# Patient Record
(demographics unavailable — no encounter records)

---

## 2025-04-16 NOTE — ASSESSMENT
[FreeTextEntry1] : 1) Onychomycosis with elongated toenails with pain/discomfort: aseptic debridement of elongated mycotic toenails x 8 2) Diabetes: discussed importance of glycemic control, check feet daily for any new lesions, use of proper fit shoes with accommodative insoles 3) Xerosis: recommend use of topical moisturizer BID 4) Dermatitis to feet: Rx clotrimazole-betamethasone BID, Rx mupirocin ointment for prophylaxis  Patient tolerated all treatments well and without any complications Follow up in 3 months [Verbal] : verbal [Patient] : patient [Good - alert, interested, motivated] : Good - alert, interested, motivated [Verbalizes knowledge/Understanding] : Verbalizes knowledge/understanding [Skin Care] : skin care [Pt responsibility to plan of care] : patient responsibility to plan of care [Glycemic Control] : glycemic control

## 2025-04-16 NOTE — REASON FOR VISIT
[Follow-Up Visit] : a follow-up visit for [FreeTextEntry2] : diabetic foot care, xerosis, dermatitis, onychomycosis

## 2025-04-16 NOTE — REVIEW OF SYSTEMS
[Arthralgias] : arthralgias [Skin Lesions] : skin lesion [Dry Skin] : dry skin [Negative] : Psychiatric [Joint Swelling] : no joint swelling [Joint Stiffness] : no joint stiffness [Limb Swelling] : no limb swelling [Skin Wound] : no skin wound [Itching] : no itching [Confused] : no confusion [Convulsions] : no convulsions [Dizziness] : no dizziness [Fainting] : no fainting

## 2025-04-16 NOTE — HISTORY OF PRESENT ILLNESS
[FreeTextEntry1] : Mr. SARY LIRIANO is a 80 year male who is seen in the office for diabetic foot care, xerosis, dermatitis, onychomycosis. Patient denies any current or recent fever, chills, nausea, vomiting, SOB, or chest pain. AAOx3 and in NAD.

## 2025-04-16 NOTE — PHYSICAL EXAM
[General Appearance - Alert] : alert [General Appearance - In No Acute Distress] : in no acute distress [General Appearance - Well Developed] : well developed [Delayed in the Right Toes] : capillary refills delayed in the right toes [Delayed in the Left Toes] : capillary refills delayed in the left toes [] : normal strength/tone [Skin Turgor] : normal skin turgor [Position Sense Dec.] : diminished position sense at the level of the toes [Oriented To Time, Place, And Person] : oriented to person, place, and time [Impaired Insight] : insight and judgment were intact [Affect] : the affect was normal [de-identified] : ROM of ankle, subtalar, midtarsal, MPJ, IPJ are adequate and nontender bilateral 5/5 muscle power in inversion, eversion, dorsiflexion, plantarflexion bilateral [Foot Ulcer] : no foot ulcer [FreeTextEntry1] : gross epicritic sensation to feet diminished, light touch sensation intact, sharp/dull sensation intact

## 2025-04-16 NOTE — REVIEW OF SYSTEMS
English [Arthralgias] : arthralgias [Skin Lesions] : skin lesion [Dry Skin] : dry skin [Negative] : Psychiatric [Joint Swelling] : no joint swelling [Joint Stiffness] : no joint stiffness [Limb Swelling] : no limb swelling [Skin Wound] : no skin wound [Itching] : no itching [Confused] : no confusion [Convulsions] : no convulsions [Dizziness] : no dizziness [Fainting] : no fainting

## 2025-04-16 NOTE — PHYSICAL EXAM
[General Appearance - Alert] : alert [General Appearance - In No Acute Distress] : in no acute distress [General Appearance - Well Developed] : well developed [Delayed in the Right Toes] : capillary refills delayed in the right toes [Delayed in the Left Toes] : capillary refills delayed in the left toes [] : normal strength/tone [Skin Turgor] : normal skin turgor [Position Sense Dec.] : diminished position sense at the level of the toes [Oriented To Time, Place, And Person] : oriented to person, place, and time [Impaired Insight] : insight and judgment were intact [Affect] : the affect was normal [de-identified] : ROM of ankle, subtalar, midtarsal, MPJ, IPJ are adequate and nontender bilateral 5/5 muscle power in inversion, eversion, dorsiflexion, plantarflexion bilateral [Foot Ulcer] : no foot ulcer [FreeTextEntry1] : gross epicritic sensation to feet diminished, light touch sensation intact, sharp/dull sensation intact

## 2025-07-26 NOTE — HISTORY OF PRESENT ILLNESS
[FreeTextEntry1] : Mr. SARY LIRIANO is a 80 year male who is seen in the office for diabetic foot care, xerosis, dermatitis, onychomycosis.  Patient denies any current or recent fever, chills, nausea, vomiting, SOB, or chest pain. AAOx3 and in NAD.  REVIEW OF SYSTEMS:      Musculoskeletal: +arthralgias, but no joint swelling, no joint stiffness and no limb swelling.   Integumentary: +skin lesion +dry skin, but no skin wound and no itching.   Neurological: no confusion, no convulsions, no dizziness and no fainting.   Constitutional and Psychiatric are otherwise negative.  PHYSICAL EXAM:    Constitutional: alert, in no acute distress, well developed and normal voice and communication  Vascular:   capillary refills delayed in the right toes and capillary refills delayed in the left toes  Musculoskeletal: ROM of ankle, subtalar, midtarsal, MPJ, IPJ are adequate and nontender bilateral 5/5 muscle power in inversion, eversion, dorsiflexion, plantarflexion bilateral and normal strength/tone.  Skin: normal skin turgor and no foot ulcer, +dry skin to bilateral lower extremities No open lesions, no cellulitis, no fluctuance, no ecchymosis, mild edema.  Neurological:   The sensory exam of the right foot showed diminished position sense at the level of the toes.   The sensory exam of the left foot showed diminished position sense at the level of the toes. Gross epicritic sensation to feet diminished, light touch sensation intact, sharp/dull sensation intact  Psychiatric: oriented to person, place, and time, insight and judgment were intact and the affect was normal.  PROCEDURE: 1) Onychomycosis with onychauxis/onychogryphosis: aseptic debridement of thick/elongated, incurvated, mycotic toenails with pain/discomfort x 8 was done 07/23/2025   ASSESSMENT/PLAN: 1) Onychomycosis with onychauxis/onychogryphosis: aseptic debridement of thick/elongated, incurvated, mycotic toenails with pain/discomfort x 8 was done 07/23/2025 2) Diabetes with neuropathy: discussed importance of glycemic control, check feet daily for any new lesions, use of proper fit shoes with accommodative insoles 3) Xerosis: recommend use of topical moisturizer BID 4) Dermatitis to feet: may use clotrimazole-betamethasone only prn  Patient with Q9, class B and C findings of:   + decreased pedal hair growth + thickened toenail changes - skin discoloration + thin/shiny skin texture - skin rubor   - claudication - cool feet + edema + paresthesia - burning   Patient tolerated all treatments well and without any complications Follow up in 4 months